# Patient Record
Sex: FEMALE
[De-identification: names, ages, dates, MRNs, and addresses within clinical notes are randomized per-mention and may not be internally consistent; named-entity substitution may affect disease eponyms.]

---

## 2020-02-24 PROBLEM — Z00.00 ENCOUNTER FOR PREVENTIVE HEALTH EXAMINATION: Status: ACTIVE | Noted: 2020-02-24

## 2020-03-03 ENCOUNTER — APPOINTMENT (OUTPATIENT)
Dept: PEDIATRIC ORTHOPEDIC SURGERY | Facility: CLINIC | Age: 25
End: 2020-03-03
Payer: MEDICAID

## 2020-03-03 ENCOUNTER — TRANSCRIPTION ENCOUNTER (OUTPATIENT)
Age: 25
End: 2020-03-03

## 2020-03-03 DIAGNOSIS — M79.7 FIBROMYALGIA: ICD-10-CM

## 2020-03-03 DIAGNOSIS — F43.10 POST-TRAUMATIC STRESS DISORDER, UNSPECIFIED: ICD-10-CM

## 2020-03-03 DIAGNOSIS — M54.5 LOW BACK PAIN: ICD-10-CM

## 2020-03-03 DIAGNOSIS — Z82.69 FAMILY HISTORY OF OTHER DISEASES OF THE MUSCULOSKELETAL SYSTEM AND CONNECTIVE TISSUE: ICD-10-CM

## 2020-03-03 DIAGNOSIS — M54.6 PAIN IN THORACIC SPINE: ICD-10-CM

## 2020-03-03 DIAGNOSIS — F41.9 ANXIETY DISORDER, UNSPECIFIED: ICD-10-CM

## 2020-03-03 DIAGNOSIS — F32.9 ANXIETY DISORDER, UNSPECIFIED: ICD-10-CM

## 2020-03-03 PROCEDURE — 99204 OFFICE O/P NEW MOD 45 MIN: CPT

## 2020-03-03 RX ORDER — FLUOXETINE HYDROCHLORIDE 40 MG/1
CAPSULE ORAL
Refills: 0 | Status: ACTIVE | COMMUNITY

## 2020-03-03 RX ORDER — ALPRAZOLAM 2 MG/1
TABLET ORAL
Refills: 0 | Status: ACTIVE | COMMUNITY

## 2020-03-03 RX ORDER — CLONIDINE HYDROCHLORIDE 0.1 MG/1
0.1 TABLET ORAL
Refills: 0 | Status: ACTIVE | COMMUNITY

## 2020-03-04 PROBLEM — M54.5 LOW BACK PAIN: Status: ACTIVE | Noted: 2020-03-03

## 2020-03-04 PROBLEM — M79.7 FIBROMYALGIA: Status: ACTIVE | Noted: 2020-03-03

## 2020-03-04 PROBLEM — M54.6 MIDLINE THORACIC BACK PAIN: Status: ACTIVE | Noted: 2020-03-03

## 2020-03-04 NOTE — ASSESSMENT
[FreeTextEntry1] : Had a long discussion with the family about treating back pain. We decided to start with:\par \par 1. She failed a course of physical therapy so we'll get an MRI \par \par 2. Run a battery of labs to rule out systematic causes for back pain.\par \par 3. Refer to Rheumatology to r/o finbromyalgia\par \par \par Stressed the improtance on mental health optimization as that affects preception of pain \par \par

## 2020-03-04 NOTE — HISTORY OF PRESENT ILLNESS
[8] : a current pain level of 8/10 [Worsening] : worsening [Sitting] : worsened by sitting [Constant] : ~He/She~ states the symptoms seem to be constant [Bending] : worsened by bending [Standing] : worsened by standing [Walking] : worsened by walking [Rest] : relieved by rest [de-identified] : LAKSHMI has been having back pain for several years. \par Pain comes and goes, happens with some activities, no specific pattern. Not better with any meds. \par Has  been doing PT for a few months currently and is not having any relief. \par \par denies any history of  fever, any history of numbness and history of tingling and history of change in bladder or bowel function and history of weakness and history of bug or tick bites or rashes\par \par Positive family history of back pain. No family history of bone diseases or RA. \par \par Please see below for past medical/surgical history.\par

## 2020-03-04 NOTE — PHYSICAL EXAM
[UE/LE] : Sensory: Intact in bilateral upper & lower extremities [ALL] : dorsalis pedis, posterior tibial, femoral, popliteal, and radial 2+ and symmetric bilaterally [Poor Appearance] : well-appearing [Acute Distress] : not in acute distress [Normal] : Oriented to person, place, and time, insight and judgement were intact and the affect was normal [de-identified] : [facility]\par Lumbar, T-spine and Sacrum/Coccyx\par Mild Asymtery\par no Scoliosis\par I visually reviewed the images\par

## 2020-03-04 NOTE — REASON FOR VISIT
[Initial Consultation] : an initial consultation for [Back Pain] : back pain [FreeTextEntry2] : back pain & scoliosis